# Patient Record
Sex: FEMALE | Race: WHITE | Employment: OTHER | URBAN - METROPOLITAN AREA
[De-identification: names, ages, dates, MRNs, and addresses within clinical notes are randomized per-mention and may not be internally consistent; named-entity substitution may affect disease eponyms.]

---

## 2017-08-15 ENCOUNTER — HOSPITAL ENCOUNTER (OUTPATIENT)
Dept: GENERAL RADIOLOGY | Facility: HOSPITAL | Age: 82
Discharge: HOME | End: 2017-08-15
Attending: INTERNAL MEDICINE
Payer: MEDICARE

## 2017-08-15 DIAGNOSIS — M54.6 THORACIC SPINE PAIN: ICD-10-CM

## 2017-08-15 PROCEDURE — 72072 X-RAY EXAM THORAC SPINE 3VWS: CPT | Performed by: INTERNAL MEDICINE

## 2019-01-01 ENCOUNTER — APPOINTMENT (OUTPATIENT)
Dept: MRI IMAGING | Facility: HOSPITAL | Age: 84
End: 2019-01-01
Attending: EMERGENCY MEDICINE
Payer: MEDICARE

## 2019-01-01 ENCOUNTER — APPOINTMENT (OUTPATIENT)
Dept: MRI IMAGING | Facility: HOSPITAL | Age: 84
End: 2019-01-01
Attending: Other
Payer: MEDICARE

## 2019-01-01 ENCOUNTER — APPOINTMENT (OUTPATIENT)
Dept: CV DIAGNOSTICS | Facility: HOSPITAL | Age: 84
DRG: 644 | End: 2019-01-01
Attending: HOSPITALIST
Payer: MEDICARE

## 2019-01-01 ENCOUNTER — APPOINTMENT (OUTPATIENT)
Dept: ULTRASOUND IMAGING | Facility: HOSPITAL | Age: 84
End: 2019-01-01
Attending: Other
Payer: MEDICARE

## 2019-01-01 ENCOUNTER — TELEPHONE (OUTPATIENT)
Dept: NEUROLOGY | Facility: CLINIC | Age: 84
End: 2019-01-01

## 2019-01-01 ENCOUNTER — HOSPITAL ENCOUNTER (OUTPATIENT)
Facility: HOSPITAL | Age: 84
Setting detail: OBSERVATION
Discharge: NURSING, REHAB OR LONG-TERM CARE | End: 2019-01-01
Attending: EMERGENCY MEDICINE | Admitting: HOSPITALIST
Payer: MEDICARE

## 2019-01-01 ENCOUNTER — APPOINTMENT (OUTPATIENT)
Dept: CT IMAGING | Facility: HOSPITAL | Age: 84
End: 2019-01-01
Attending: EMERGENCY MEDICINE
Payer: MEDICARE

## 2019-01-01 ENCOUNTER — APPOINTMENT (OUTPATIENT)
Dept: GENERAL RADIOLOGY | Facility: HOSPITAL | Age: 84
DRG: 644 | End: 2019-01-01
Attending: HOSPITALIST
Payer: MEDICARE

## 2019-01-01 ENCOUNTER — APPOINTMENT (OUTPATIENT)
Dept: CV DIAGNOSTICS | Facility: HOSPITAL | Age: 84
End: 2019-01-01
Attending: Other
Payer: MEDICARE

## 2019-01-01 ENCOUNTER — APPOINTMENT (OUTPATIENT)
Dept: PICC SERVICES | Facility: HOSPITAL | Age: 84
DRG: 644 | End: 2019-01-01
Attending: INTERNAL MEDICINE
Payer: MEDICARE

## 2019-01-01 ENCOUNTER — APPOINTMENT (OUTPATIENT)
Dept: CT IMAGING | Facility: HOSPITAL | Age: 84
DRG: 644 | End: 2019-01-01
Attending: HOSPITALIST
Payer: MEDICARE

## 2019-01-01 ENCOUNTER — HOSPITAL ENCOUNTER (INPATIENT)
Facility: HOSPITAL | Age: 84
LOS: 6 days | Discharge: ANOTHER HOSPITAL | DRG: 644 | End: 2019-01-01
Attending: EMERGENCY MEDICINE | Admitting: HOSPITALIST
Payer: MEDICARE

## 2019-01-01 VITALS
BODY MASS INDEX: 16.54 KG/M2 | OXYGEN SATURATION: 94 % | HEART RATE: 86 BPM | TEMPERATURE: 98 F | DIASTOLIC BLOOD PRESSURE: 48 MMHG | RESPIRATION RATE: 18 BRPM | SYSTOLIC BLOOD PRESSURE: 125 MMHG | WEIGHT: 102.88 LBS | HEIGHT: 66 IN

## 2019-01-01 VITALS
OXYGEN SATURATION: 100 % | HEART RATE: 66 BPM | WEIGHT: 111.5 LBS | RESPIRATION RATE: 21 BRPM | DIASTOLIC BLOOD PRESSURE: 92 MMHG | SYSTOLIC BLOOD PRESSURE: 177 MMHG | TEMPERATURE: 98 F | HEIGHT: 63 IN | BODY MASS INDEX: 19.76 KG/M2

## 2019-01-01 DIAGNOSIS — R42 DIZZINESS: Primary | ICD-10-CM

## 2019-01-01 DIAGNOSIS — E87.1 HYPONATREMIA: Primary | ICD-10-CM

## 2019-01-01 DIAGNOSIS — G45.9 TRANSIENT ISCHEMIC ATTACK (TIA): ICD-10-CM

## 2019-01-01 LAB
ALBUMIN SERPL BCP-MCNC: 2.6 G/DL (ref 3.5–4.8)
ALBUMIN SERPL-MCNC: 2.1 G/DL (ref 3.4–5)
ALBUMIN SERPL-MCNC: 2.2 G/DL (ref 3.4–5)
ALP SERPL-CCNC: 52 U/L (ref 32–100)
ALT SERPL-CCNC: 7 U/L (ref 14–54)
ANION GAP SERPL CALC-SCNC: 10 MMOL/L (ref 0–18)
ANION GAP SERPL CALC-SCNC: 2 MMOL/L (ref 0–18)
ANION GAP SERPL CALC-SCNC: 4 MMOL/L (ref 0–18)
ANION GAP SERPL CALC-SCNC: 4 MMOL/L (ref 0–18)
ANION GAP SERPL CALC-SCNC: 5 MMOL/L (ref 0–18)
ANION GAP SERPL CALC-SCNC: 6 MMOL/L (ref 0–18)
ANION GAP SERPL CALC-SCNC: 6 MMOL/L (ref 0–18)
ANION GAP SERPL CALC-SCNC: 7 MMOL/L (ref 0–18)
ANION GAP SERPL CALC-SCNC: 9 MMOL/L (ref 0–18)
AST SERPL-CCNC: 18 U/L (ref 15–41)
BACTERIA UR QL AUTO: NEGATIVE /HPF
BASOPHILS # BLD AUTO: 0.04 X10(3) UL (ref 0–0.2)
BASOPHILS # BLD AUTO: 0.04 X10(3) UL (ref 0–0.2)
BASOPHILS # BLD AUTO: 0.05 X10(3) UL (ref 0–0.2)
BASOPHILS # BLD AUTO: 0.06 X10(3) UL (ref 0–0.2)
BASOPHILS # BLD: 0 K/UL (ref 0–0.2)
BASOPHILS # BLD: 0.2 K/UL (ref 0–0.2)
BASOPHILS # BLD: 0.2 K/UL (ref 0–0.2)
BASOPHILS NFR BLD AUTO: 0.7 %
BASOPHILS NFR BLD AUTO: 0.8 %
BASOPHILS NFR BLD AUTO: 1 %
BASOPHILS NFR BLD AUTO: 1.1 %
BASOPHILS NFR BLD: 1 %
BASOPHILS NFR BLD: 4 %
BASOPHILS NFR BLD: 5 %
BILIRUB DIRECT SERPL-MCNC: 0.1 MG/DL (ref 0–0.2)
BILIRUB SERPL-MCNC: 0.5 MG/DL (ref 0.3–1.2)
BILIRUB UR QL: NEGATIVE
BILIRUB UR QL: NEGATIVE
BUN BLD-MCNC: 10 MG/DL (ref 7–18)
BUN BLD-MCNC: 12 MG/DL (ref 7–18)
BUN BLD-MCNC: 13 MG/DL (ref 7–18)
BUN BLD-MCNC: 14 MG/DL (ref 7–18)
BUN BLD-MCNC: 15 MG/DL (ref 7–18)
BUN BLD-MCNC: 15 MG/DL (ref 7–18)
BUN BLD-MCNC: 9 MG/DL (ref 7–18)
BUN SERPL-MCNC: 11 MG/DL (ref 8–20)
BUN SERPL-MCNC: 14 MG/DL (ref 8–20)
BUN SERPL-MCNC: 9 MG/DL (ref 8–20)
BUN/CREAT SERPL: 23.1 (ref 10–20)
BUN/CREAT SERPL: 24.4 (ref 10–20)
BUN/CREAT SERPL: 28.6 (ref 10–20)
BUN/CREAT SERPL: 29.2 (ref 10–20)
BUN/CREAT SERPL: 33.3 (ref 10–20)
BUN/CREAT SERPL: 36.1 (ref 10–20)
BUN/CREAT SERPL: 37.5 (ref 10–20)
BUN/CREAT SERPL: 41.2 (ref 10–20)
BUN/CREAT SERPL: 41.9 (ref 10–20)
BUN/CREAT SERPL: 44.4 (ref 10–20)
BUN/CREAT SERPL: 44.8 (ref 10–20)
BUN/CREAT SERPL: 48.3 (ref 10–20)
BUN/CREAT SERPL: 50 (ref 10–20)
BUN/CREAT SERPL: 56.5 (ref 10–20)
BUN/CREAT SERPL: 60 (ref 10–20)
CALCIUM BLD-MCNC: 7.2 MG/DL (ref 8.5–10.1)
CALCIUM BLD-MCNC: 7.4 MG/DL (ref 8.5–10.1)
CALCIUM BLD-MCNC: 7.5 MG/DL (ref 8.5–10.1)
CALCIUM BLD-MCNC: 7.6 MG/DL (ref 8.5–10.1)
CALCIUM BLD-MCNC: 7.6 MG/DL (ref 8.5–10.1)
CALCIUM BLD-MCNC: 7.7 MG/DL (ref 8.5–10.1)
CALCIUM BLD-MCNC: 7.8 MG/DL (ref 8.5–10.1)
CALCIUM BLD-MCNC: 7.9 MG/DL (ref 8.5–10.1)
CALCIUM BLD-MCNC: 7.9 MG/DL (ref 8.5–10.1)
CALCIUM BLD-MCNC: 8 MG/DL (ref 8.5–10.1)
CALCIUM BLD-MCNC: 8.2 MG/DL (ref 8.5–10.1)
CALCIUM BLD-MCNC: 8.2 MG/DL (ref 8.5–10.1)
CALCIUM SERPL-MCNC: 8.3 MG/DL (ref 8.5–10.5)
CALCIUM SERPL-MCNC: 8.8 MG/DL (ref 8.5–10.5)
CALCIUM SERPL-MCNC: 8.9 MG/DL (ref 8.5–10.5)
CHLORIDE SERPL-SCNC: 80 MMOL/L (ref 98–107)
CHLORIDE SERPL-SCNC: 82 MMOL/L (ref 98–107)
CHLORIDE SERPL-SCNC: 83 MMOL/L (ref 98–107)
CHLORIDE SERPL-SCNC: 87 MMOL/L (ref 98–107)
CHLORIDE SERPL-SCNC: 90 MMOL/L (ref 98–107)
CHLORIDE SERPL-SCNC: 90 MMOL/L (ref 98–107)
CHLORIDE SERPL-SCNC: 95 MMOL/L (ref 98–107)
CHLORIDE SERPL-SCNC: 96 MMOL/L (ref 95–110)
CHLORIDE SERPL-SCNC: 96 MMOL/L (ref 98–107)
CHLORIDE SERPL-SCNC: 96 MMOL/L (ref 98–107)
CHLORIDE SERPL-SCNC: 97 MMOL/L (ref 95–110)
CHLORIDE SERPL-SCNC: 97 MMOL/L (ref 98–107)
CHLORIDE SERPL-SCNC: 97 MMOL/L (ref 98–107)
CHLORIDE SERPL-SCNC: 98 MMOL/L (ref 95–110)
CHLORIDE SERPL-SCNC: 98 MMOL/L (ref 98–107)
CHOLEST SERPL-MCNC: 218 MG/DL (ref 110–200)
CK SERPL-CCNC: 81 U/L (ref 38–234)
CLARITY UR: CLEAR
CO2 SERPL-SCNC: 26 MMOL/L (ref 22–32)
CO2 SERPL-SCNC: 26 MMOL/L (ref 22–32)
CO2 SERPL-SCNC: 27 MMOL/L (ref 21–32)
CO2 SERPL-SCNC: 27 MMOL/L (ref 21–32)
CO2 SERPL-SCNC: 28 MMOL/L (ref 21–32)
CO2 SERPL-SCNC: 28 MMOL/L (ref 22–32)
CO2 SERPL-SCNC: 29 MMOL/L (ref 21–32)
CO2 SERPL-SCNC: 30 MMOL/L (ref 21–32)
CO2 SERPL-SCNC: 30 MMOL/L (ref 21–32)
CO2 SERPL-SCNC: 31 MMOL/L (ref 21–32)
CO2 SERPL-SCNC: 31 MMOL/L (ref 21–32)
CO2 SERPL-SCNC: 33 MMOL/L (ref 21–32)
CO2 SERPL-SCNC: 35 MMOL/L (ref 21–32)
COLOR UR: YELLOW
COLOR UR: YELLOW
CREAT BLD-MCNC: 0.23 MG/DL (ref 0.55–1.02)
CREAT BLD-MCNC: 0.24 MG/DL (ref 0.55–1.02)
CREAT BLD-MCNC: 0.25 MG/DL (ref 0.55–1.02)
CREAT BLD-MCNC: 0.27 MG/DL (ref 0.55–1.02)
CREAT BLD-MCNC: 0.28 MG/DL (ref 0.55–1.02)
CREAT BLD-MCNC: 0.29 MG/DL (ref 0.55–1.02)
CREAT BLD-MCNC: 0.29 MG/DL (ref 0.55–1.02)
CREAT BLD-MCNC: 0.31 MG/DL (ref 0.55–1.02)
CREAT BLD-MCNC: 0.34 MG/DL (ref 0.55–1.02)
CREAT BLD-MCNC: 0.35 MG/DL (ref 0.55–1.02)
CREAT BLD-MCNC: 0.36 MG/DL (ref 0.55–1.02)
CREAT BLD-MCNC: 0.45 MG/DL (ref 0.55–1.02)
CREAT SERPL-MCNC: 0.39 MG/DL (ref 0.5–1.5)
CREAT SERPL-MCNC: 0.45 MG/DL (ref 0.5–1.5)
CREAT SERPL-MCNC: 0.48 MG/DL (ref 0.5–1.5)
CREAT UR-SCNC: 51.1 MG/DL
DEPRECATED RDW RBC AUTO: 42.4 FL (ref 35.1–46.3)
DEPRECATED RDW RBC AUTO: 42.5 FL (ref 35.1–46.3)
DEPRECATED RDW RBC AUTO: 48.3 FL (ref 35.1–46.3)
DEPRECATED RDW RBC AUTO: 49.2 FL (ref 35.1–46.3)
EOSINOPHIL # BLD AUTO: 0 X10(3) UL (ref 0–0.7)
EOSINOPHIL # BLD AUTO: 0.01 X10(3) UL (ref 0–0.7)
EOSINOPHIL # BLD AUTO: 0.04 X10(3) UL (ref 0–0.7)
EOSINOPHIL # BLD AUTO: 0.1 X10(3) UL (ref 0–0.7)
EOSINOPHIL # BLD: 0.1 K/UL (ref 0–0.7)
EOSINOPHIL NFR BLD AUTO: 0 %
EOSINOPHIL NFR BLD AUTO: 0.2 %
EOSINOPHIL NFR BLD AUTO: 0.8 %
EOSINOPHIL NFR BLD AUTO: 1.8 %
EOSINOPHIL NFR BLD: 2 %
ERYTHROCYTE [DISTWIDTH] IN BLOOD BY AUTOMATED COUNT: 13.5 % (ref 11–15)
ERYTHROCYTE [DISTWIDTH] IN BLOOD BY AUTOMATED COUNT: 13.8 % (ref 11–15)
ERYTHROCYTE [DISTWIDTH] IN BLOOD BY AUTOMATED COUNT: 14.3 % (ref 11–15)
ERYTHROCYTE [DISTWIDTH] IN BLOOD BY AUTOMATED COUNT: 14.5 % (ref 11–15)
ERYTHROCYTE [DISTWIDTH] IN BLOOD BY AUTOMATED COUNT: 14.7 % (ref 11–15)
ERYTHROCYTE [DISTWIDTH] IN BLOOD BY AUTOMATED COUNT: 14.7 % (ref 11–15)
ERYTHROCYTE [DISTWIDTH] IN BLOOD BY AUTOMATED COUNT: 15 % (ref 11–15)
EST. AVERAGE GLUCOSE BLD GHB EST-MCNC: 97 MG/DL (ref 68–126)
GLUCOSE BLD-MCNC: 117 MG/DL (ref 70–99)
GLUCOSE BLD-MCNC: 81 MG/DL (ref 70–99)
GLUCOSE BLD-MCNC: 86 MG/DL (ref 70–99)
GLUCOSE BLD-MCNC: 89 MG/DL (ref 70–99)
GLUCOSE BLD-MCNC: 91 MG/DL (ref 70–99)
GLUCOSE BLD-MCNC: 92 MG/DL (ref 70–99)
GLUCOSE BLD-MCNC: 92 MG/DL (ref 70–99)
GLUCOSE BLD-MCNC: 93 MG/DL (ref 70–99)
GLUCOSE BLD-MCNC: 93 MG/DL (ref 70–99)
GLUCOSE BLD-MCNC: 94 MG/DL (ref 70–99)
GLUCOSE BLD-MCNC: 94 MG/DL (ref 70–99)
GLUCOSE BLD-MCNC: 97 MG/DL (ref 70–99)
GLUCOSE SERPL-MCNC: 86 MG/DL (ref 70–99)
GLUCOSE SERPL-MCNC: 91 MG/DL (ref 70–99)
GLUCOSE SERPL-MCNC: 96 MG/DL (ref 70–99)
GLUCOSE UR-MCNC: NEGATIVE MG/DL
GLUCOSE UR-MCNC: NEGATIVE MG/DL
HBA1C MFR BLD HPLC: 5 % (ref ?–5.7)
HCT VFR BLD AUTO: 29.8 % (ref 35–48)
HCT VFR BLD AUTO: 30 % (ref 35–48)
HCT VFR BLD AUTO: 30.2 % (ref 35–48)
HCT VFR BLD AUTO: 31.1 % (ref 35–48)
HCT VFR BLD AUTO: 31.5 % (ref 35–48)
HCT VFR BLD AUTO: 31.8 % (ref 35–48)
HCT VFR BLD AUTO: 33.1 % (ref 35–48)
HCT VFR BLD AUTO: 33.5 % (ref 35–48)
HCT VFR BLD AUTO: 37.2 % (ref 35–48)
HDLC SERPL-MCNC: 73 MG/DL
HGB BLD-MCNC: 10.4 G/DL (ref 12–16)
HGB BLD-MCNC: 10.7 G/DL (ref 12–16)
HGB BLD-MCNC: 11 G/DL (ref 12–16)
HGB BLD-MCNC: 11.1 G/DL (ref 12–16)
HGB BLD-MCNC: 12.2 G/DL (ref 12–16)
HGB BLD-MCNC: 9.2 G/DL (ref 12–16)
HGB BLD-MCNC: 9.2 G/DL (ref 12–16)
HGB BLD-MCNC: 9.3 G/DL (ref 12–16)
HGB BLD-MCNC: 9.6 G/DL (ref 12–16)
IMM GRANULOCYTES # BLD AUTO: 0.01 X10(3) UL (ref 0–1)
IMM GRANULOCYTES # BLD AUTO: 0.01 X10(3) UL (ref 0–1)
IMM GRANULOCYTES # BLD AUTO: 0.02 X10(3) UL (ref 0–1)
IMM GRANULOCYTES # BLD AUTO: 0.03 X10(3) UL (ref 0–1)
IMM GRANULOCYTES NFR BLD: 0.2 %
IMM GRANULOCYTES NFR BLD: 0.2 %
IMM GRANULOCYTES NFR BLD: 0.4 %
IMM GRANULOCYTES NFR BLD: 0.6 %
INR BLD: 1.1 (ref 0.9–1.2)
KETONES UR-MCNC: 20 MG/DL
LDLC SERPL CALC-MCNC: 136 MG/DL (ref 0–99)
LEUKOCYTE ESTERASE UR QL STRIP.AUTO: NEGATIVE
LEUKOCYTE ESTERASE UR QL STRIP.AUTO: NEGATIVE
LYMPHOCYTES # BLD AUTO: 0.45 X10(3) UL (ref 1–4)
LYMPHOCYTES # BLD AUTO: 0.46 X10(3) UL (ref 1–4)
LYMPHOCYTES # BLD AUTO: 0.52 X10(3) UL (ref 1–4)
LYMPHOCYTES # BLD AUTO: 0.53 X10(3) UL (ref 1–4)
LYMPHOCYTES # BLD: 0.5 K/UL (ref 1–4)
LYMPHOCYTES # BLD: 0.6 K/UL (ref 1–4)
LYMPHOCYTES # BLD: 0.6 K/UL (ref 1–4)
LYMPHOCYTES NFR BLD AUTO: 10.1 %
LYMPHOCYTES NFR BLD AUTO: 8.6 %
LYMPHOCYTES NFR BLD AUTO: 9.2 %
LYMPHOCYTES NFR BLD AUTO: 9.2 %
LYMPHOCYTES NFR BLD: 13 %
LYMPHOCYTES NFR BLD: 13 %
LYMPHOCYTES NFR BLD: 16 %
MCH RBC QN AUTO: 28.1 PG (ref 26–34)
MCH RBC QN AUTO: 28.3 PG (ref 26–34)
MCH RBC QN AUTO: 28.4 PG (ref 26–34)
MCH RBC QN AUTO: 28.5 PG (ref 26–34)
MCH RBC QN AUTO: 28.7 PG (ref 27–32)
MCH RBC QN AUTO: 28.9 PG (ref 27–32)
MCH RBC QN AUTO: 29.1 PG (ref 27–32)
MCHC RBC AUTO-ENTMCNC: 30.7 G/DL (ref 31–37)
MCHC RBC AUTO-ENTMCNC: 30.9 G/DL (ref 31–37)
MCHC RBC AUTO-ENTMCNC: 32.8 G/DL (ref 32–37)
MCHC RBC AUTO-ENTMCNC: 32.9 G/DL (ref 32–37)
MCHC RBC AUTO-ENTMCNC: 33 G/DL (ref 31–37)
MCHC RBC AUTO-ENTMCNC: 33.5 G/DL (ref 31–37)
MCHC RBC AUTO-ENTMCNC: 33.7 G/DL (ref 32–37)
MCV RBC AUTO: 84.7 FL (ref 80–100)
MCV RBC AUTO: 85.1 FL (ref 80–100)
MCV RBC AUTO: 86.4 FL (ref 80–100)
MCV RBC AUTO: 87.3 FL (ref 80–100)
MCV RBC AUTO: 87.8 FL (ref 80–100)
MCV RBC AUTO: 91.7 FL (ref 80–100)
MCV RBC AUTO: 92.9 FL (ref 80–100)
MONOCYTES # BLD AUTO: 0.57 X10(3) UL (ref 0.1–1)
MONOCYTES # BLD AUTO: 0.58 X10(3) UL (ref 0.1–1)
MONOCYTES # BLD AUTO: 0.64 X10(3) UL (ref 0.1–1)
MONOCYTES # BLD AUTO: 0.77 X10(3) UL (ref 0.1–1)
MONOCYTES # BLD: 0.4 K/UL (ref 0–1)
MONOCYTES # BLD: 0.4 K/UL (ref 0–1)
MONOCYTES # BLD: 0.6 K/UL (ref 0–1)
MONOCYTES NFR BLD AUTO: 10.7 %
MONOCYTES NFR BLD AUTO: 11.3 %
MONOCYTES NFR BLD AUTO: 11.8 %
MONOCYTES NFR BLD AUTO: 14.7 %
MONOCYTES NFR BLD: 10 %
MONOCYTES NFR BLD: 10 %
MONOCYTES NFR BLD: 12 %
MRSA DNA SPEC QL NAA+PROBE: NEGATIVE
NEUTROPHILS # BLD AUTO: 2.7 K/UL (ref 1.8–7.7)
NEUTROPHILS # BLD AUTO: 2.9 K/UL (ref 1.8–7.7)
NEUTROPHILS # BLD AUTO: 3.4 K/UL (ref 1.8–7.7)
NEUTROPHILS # BLD AUTO: 3.78 X10 (3) UL (ref 1.5–7.7)
NEUTROPHILS # BLD AUTO: 3.78 X10(3) UL (ref 1.5–7.7)
NEUTROPHILS # BLD AUTO: 3.86 X10 (3) UL (ref 1.5–7.7)
NEUTROPHILS # BLD AUTO: 3.86 X10(3) UL (ref 1.5–7.7)
NEUTROPHILS # BLD AUTO: 4.25 X10 (3) UL (ref 1.5–7.7)
NEUTROPHILS # BLD AUTO: 4.25 X10(3) UL (ref 1.5–7.7)
NEUTROPHILS # BLD AUTO: 4.31 X10 (3) UL (ref 1.5–7.7)
NEUTROPHILS # BLD AUTO: 4.31 X10(3) UL (ref 1.5–7.7)
NEUTROPHILS NFR BLD AUTO: 73.6 %
NEUTROPHILS NFR BLD AUTO: 76.4 %
NEUTROPHILS NFR BLD AUTO: 77 %
NEUTROPHILS NFR BLD AUTO: 79.6 %
NEUTROPHILS NFR BLD: 69 %
NEUTROPHILS NFR BLD: 70 %
NEUTROPHILS NFR BLD: 72 %
NITRITE UR QL STRIP.AUTO: NEGATIVE
NITRITE UR QL STRIP.AUTO: NEGATIVE
NONHDLC SERPL-MCNC: 145 MG/DL
OSMOLALITY SERPL CALC.SUM OF ELEC: 242 MOSM/KG (ref 275–295)
OSMOLALITY SERPL CALC.SUM OF ELEC: 242 MOSM/KG (ref 275–295)
OSMOLALITY SERPL CALC.SUM OF ELEC: 248 MOSM/KG (ref 275–295)
OSMOLALITY SERPL CALC.SUM OF ELEC: 260 MOSM/KG (ref 275–295)
OSMOLALITY SERPL CALC.SUM OF ELEC: 260 MOSM/KG (ref 275–295)
OSMOLALITY SERPL CALC.SUM OF ELEC: 261 MOSM/KG (ref 275–295)
OSMOLALITY SERPL CALC.SUM OF ELEC: 271 MOSM/KG (ref 275–295)
OSMOLALITY SERPL CALC.SUM OF ELEC: 272 MOSM/KG (ref 275–295)
OSMOLALITY SERPL CALC.SUM OF ELEC: 272 MOSM/KG (ref 275–295)
OSMOLALITY SERPL CALC.SUM OF ELEC: 274 MOSM/KG (ref 275–295)
OSMOLALITY SERPL CALC.SUM OF ELEC: 275 MOSM/KG (ref 275–295)
OSMOLALITY SERPL CALC.SUM OF ELEC: 276 MOSM/KG (ref 275–295)
OSMOLALITY SERPL: 233 MOSM/KG (ref 275–295)
OSMOLALITY UR CALC.SUM OF ELEC: 270 MOSM/KG (ref 275–295)
OSMOLALITY UR CALC.SUM OF ELEC: 275 MOSM/KG (ref 275–295)
OSMOLALITY UR CALC.SUM OF ELEC: 280 MOSM/KG (ref 275–295)
OSMOLALITY UR: 507 MOSM/KG (ref 300–1100)
PH UR: 7 [PH] (ref 5–8)
PH UR: 8 [PH] (ref 5–8)
PHOSPHATE SERPL-MCNC: 2.1 MG/DL (ref 2.5–4.9)
PHOSPHATE SERPL-MCNC: 2.7 MG/DL (ref 2.5–4.9)
PLATELET # BLD AUTO: 154 K/UL (ref 140–400)
PLATELET # BLD AUTO: 159 K/UL (ref 140–400)
PLATELET # BLD AUTO: 171 K/UL (ref 140–400)
PLATELET # BLD AUTO: 183 10(3)UL (ref 150–450)
PLATELET # BLD AUTO: 207 10(3)UL (ref 150–450)
PLATELET # BLD AUTO: 253 10(3)UL (ref 150–450)
PLATELET # BLD AUTO: 274 10(3)UL (ref 150–450)
PMV BLD AUTO: 7.9 FL (ref 7.4–10.3)
PMV BLD AUTO: 8 FL (ref 7.4–10.3)
PMV BLD AUTO: 8.4 FL (ref 7.4–10.3)
POTASSIUM SERPL-SCNC: 3.4 MMOL/L (ref 3.3–5.1)
POTASSIUM SERPL-SCNC: 3.4 MMOL/L (ref 3.5–5.1)
POTASSIUM SERPL-SCNC: 3.4 MMOL/L (ref 3.5–5.1)
POTASSIUM SERPL-SCNC: 3.7 MMOL/L (ref 3.5–5.1)
POTASSIUM SERPL-SCNC: 3.7 MMOL/L (ref 3.5–5.1)
POTASSIUM SERPL-SCNC: 3.8 MMOL/L (ref 3.5–5.1)
POTASSIUM SERPL-SCNC: 3.9 MMOL/L (ref 3.3–5.1)
POTASSIUM SERPL-SCNC: 3.9 MMOL/L (ref 3.5–5.1)
POTASSIUM SERPL-SCNC: 4 MMOL/L (ref 3.5–5.1)
POTASSIUM SERPL-SCNC: 4.2 MMOL/L (ref 3.5–5.1)
POTASSIUM SERPL-SCNC: 4.2 MMOL/L (ref 3.5–5.1)
POTASSIUM SERPL-SCNC: 4.3 MMOL/L (ref 3.5–5.1)
POTASSIUM SERPL-SCNC: 4.3 MMOL/L (ref 3.5–5.1)
POTASSIUM SERPL-SCNC: 4.6 MMOL/L (ref 3.3–5.1)
POTASSIUM SERPL-SCNC: 4.7 MMOL/L (ref 3.5–5.1)
POTASSIUM SERPL-SCNC: 4.9 MMOL/L (ref 3.3–5.1)
PROT SERPL-MCNC: 5.2 G/DL (ref 5.9–8.4)
PROT UR-MCNC: 100 MG/DL
PROT UR-MCNC: >=500 MG/DL
PROTHROMBIN TIME: 13.9 SECONDS (ref 11.8–14.5)
RBC # BLD AUTO: 3.23 X10(6)UL (ref 3.8–5.3)
RBC # BLD AUTO: 3.25 X10(6)UL (ref 3.8–5.3)
RBC # BLD AUTO: 3.68 M/UL (ref 3.7–5.4)
RBC # BLD AUTO: 3.7 X10(6)UL (ref 3.8–5.3)
RBC # BLD AUTO: 3.84 M/UL (ref 3.7–5.4)
RBC # BLD AUTO: 3.91 X10(6)UL (ref 3.8–5.3)
RBC # BLD AUTO: 4.24 M/UL (ref 3.7–5.4)
RBC #/AREA URNS AUTO: 28 /HPF
RBC #/AREA URNS AUTO: 28 /HPF
SODIUM SERPL-SCNC: 116 MMOL/L (ref 136–145)
SODIUM SERPL-SCNC: 116 MMOL/L (ref 136–145)
SODIUM SERPL-SCNC: 117 MMOL/L (ref 136–145)
SODIUM SERPL-SCNC: 117 MMOL/L (ref 136–145)
SODIUM SERPL-SCNC: 118 MMOL/L (ref 136–145)
SODIUM SERPL-SCNC: 118 MMOL/L (ref 136–145)
SODIUM SERPL-SCNC: 119 MMOL/L (ref 136–145)
SODIUM SERPL-SCNC: 122 MMOL/L (ref 136–145)
SODIUM SERPL-SCNC: 124 MMOL/L (ref 136–145)
SODIUM SERPL-SCNC: 124 MMOL/L (ref 136–145)
SODIUM SERPL-SCNC: 125 MMOL/L (ref 136–145)
SODIUM SERPL-SCNC: 125 MMOL/L (ref 136–145)
SODIUM SERPL-SCNC: 128 MMOL/L (ref 136–145)
SODIUM SERPL-SCNC: 131 MMOL/L (ref 136–144)
SODIUM SERPL-SCNC: 131 MMOL/L (ref 136–145)
SODIUM SERPL-SCNC: 132 MMOL/L (ref 136–145)
SODIUM SERPL-SCNC: 132 MMOL/L (ref 136–145)
SODIUM SERPL-SCNC: 133 MMOL/L (ref 136–144)
SODIUM SERPL-SCNC: 133 MMOL/L (ref 136–145)
SODIUM SERPL-SCNC: 133 MMOL/L (ref 136–145)
SODIUM SERPL-SCNC: 135 MMOL/L (ref 136–144)
SODIUM SERPL-SCNC: 98 MMOL/L
SP GR UR STRIP: 1.02 (ref 1–1.03)
SP GR UR STRIP: 1.02 (ref 1–1.03)
TRIGL SERPL-MCNC: 47 MG/DL (ref 1–149)
TSH SERPL-ACNC: 2.99 UIU/ML (ref 0.45–5.33)
UROBILINOGEN UR STRIP-ACNC: <2
UROBILINOGEN UR STRIP-ACNC: <2
VIT C UR-MCNC: NEGATIVE MG/DL
VIT C UR-MCNC: NEGATIVE MG/DL
WBC # BLD AUTO: 3.9 K/UL (ref 4–11)
WBC # BLD AUTO: 4 K/UL (ref 4–11)
WBC # BLD AUTO: 4.9 K/UL (ref 4–11)
WBC # BLD AUTO: 4.9 X10(3) UL (ref 4–11)
WBC # BLD AUTO: 5.2 X10(3) UL (ref 4–11)
WBC # BLD AUTO: 5.3 X10(3) UL (ref 4–11)
WBC # BLD AUTO: 5.6 X10(3) UL (ref 4–11)
WBC #/AREA URNS AUTO: 3 /HPF
WBC #/AREA URNS AUTO: 3 /HPF

## 2019-01-01 PROCEDURE — 99292 CRITICAL CARE ADDL 30 MIN: CPT | Performed by: HOSPITALIST

## 2019-01-01 PROCEDURE — 70450 CT HEAD/BRAIN W/O DYE: CPT | Performed by: EMERGENCY MEDICINE

## 2019-01-01 PROCEDURE — 99233 SBSQ HOSP IP/OBS HIGH 50: CPT | Performed by: HOSPITALIST

## 2019-01-01 PROCEDURE — 93306 TTE W/DOPPLER COMPLETE: CPT | Performed by: HOSPITALIST

## 2019-01-01 PROCEDURE — 71275 CT ANGIOGRAPHY CHEST: CPT | Performed by: HOSPITALIST

## 2019-01-01 PROCEDURE — 99223 1ST HOSP IP/OBS HIGH 75: CPT | Performed by: HOSPITALIST

## 2019-01-01 PROCEDURE — 99239 HOSP IP/OBS DSCHRG MGMT >30: CPT | Performed by: HOSPITALIST

## 2019-01-01 PROCEDURE — B5181ZA FLUOROSCOPY OF SUPERIOR VENA CAVA USING LOW OSMOLAR CONTRAST, GUIDANCE: ICD-10-PCS | Performed by: HOSPITALIST

## 2019-01-01 PROCEDURE — 70551 MRI BRAIN STEM W/O DYE: CPT | Performed by: EMERGENCY MEDICINE

## 2019-01-01 PROCEDURE — 99291 CRITICAL CARE FIRST HOUR: CPT | Performed by: HOSPITALIST

## 2019-01-01 PROCEDURE — 02HV33Z INSERTION OF INFUSION DEVICE INTO SUPERIOR VENA CAVA, PERCUTANEOUS APPROACH: ICD-10-PCS | Performed by: HOSPITALIST

## 2019-01-01 PROCEDURE — 71045 X-RAY EXAM CHEST 1 VIEW: CPT | Performed by: HOSPITALIST

## 2019-01-01 PROCEDURE — 99226 SUBSEQUENT OBSERVATION CARE: CPT | Performed by: HOSPITALIST

## 2019-01-01 PROCEDURE — 72125 CT NECK SPINE W/O DYE: CPT | Performed by: EMERGENCY MEDICINE

## 2019-01-01 PROCEDURE — 93880 EXTRACRANIAL BILAT STUDY: CPT | Performed by: OTHER

## 2019-01-01 PROCEDURE — 99217 OBSERVATION CARE DISCHARGE: CPT | Performed by: HOSPITALIST

## 2019-01-01 PROCEDURE — 72141 MRI NECK SPINE W/O DYE: CPT | Performed by: OTHER

## 2019-01-01 PROCEDURE — 99203 OFFICE O/P NEW LOW 30 MIN: CPT | Performed by: OTHER

## 2019-01-01 PROCEDURE — 93306 TTE W/DOPPLER COMPLETE: CPT | Performed by: OTHER

## 2019-01-01 PROCEDURE — 99214 OFFICE O/P EST MOD 30 MIN: CPT | Performed by: OTHER

## 2019-01-01 PROCEDURE — 70544 MR ANGIOGRAPHY HEAD W/O DYE: CPT | Performed by: EMERGENCY MEDICINE

## 2019-01-01 PROCEDURE — 99220 INITIAL OBSERVATION CARE,LEVL III: CPT | Performed by: HOSPITALIST

## 2019-01-01 PROCEDURE — 70549 MR ANGIOGRAPH NECK W/O&W/DYE: CPT | Performed by: EMERGENCY MEDICINE

## 2019-01-01 RX ORDER — 3% SODIUM CHLORIDE 3 G/100ML
INJECTION, SOLUTION INTRAVENOUS CONTINUOUS
Status: DISCONTINUED | OUTPATIENT
Start: 2019-01-01 | End: 2019-01-01

## 2019-01-01 RX ORDER — DOCUSATE SODIUM 100 MG/1
100 CAPSULE, LIQUID FILLED ORAL 2 TIMES DAILY
Status: DISCONTINUED | OUTPATIENT
Start: 2019-01-01 | End: 2019-01-01

## 2019-01-01 RX ORDER — BISACODYL 10 MG
10 SUPPOSITORY, RECTAL RECTAL
Status: DISCONTINUED | OUTPATIENT
Start: 2019-01-01 | End: 2019-01-01

## 2019-01-01 RX ORDER — ACETAMINOPHEN 325 MG/1
650 TABLET ORAL EVERY 6 HOURS PRN
Status: DISCONTINUED | OUTPATIENT
Start: 2019-01-01 | End: 2019-01-01

## 2019-01-01 RX ORDER — DOCUSATE SODIUM 100 MG/1
50 CAPSULE, LIQUID FILLED ORAL 2 TIMES DAILY
COMMUNITY

## 2019-01-01 RX ORDER — SODIUM CHLORIDE 0.9 % (FLUSH) 0.9 %
3 SYRINGE (ML) INJECTION AS NEEDED
Status: DISCONTINUED | OUTPATIENT
Start: 2019-01-01 | End: 2019-01-01

## 2019-01-01 RX ORDER — SODIUM CHLORIDE 9 MG/ML
INJECTION, SOLUTION INTRAVENOUS CONTINUOUS
Status: DISCONTINUED | OUTPATIENT
Start: 2019-01-01 | End: 2019-01-01

## 2019-01-01 RX ORDER — SODIUM CHLORIDE 9 MG/ML
1000 INJECTION, SOLUTION INTRAVENOUS ONCE
Status: COMPLETED | OUTPATIENT
Start: 2019-01-01 | End: 2019-01-01

## 2019-01-01 RX ORDER — AMIODARONE HYDROCHLORIDE 200 MG/1
200 TABLET ORAL DAILY
Status: DISCONTINUED | OUTPATIENT
Start: 2019-01-01 | End: 2019-01-01

## 2019-01-01 RX ORDER — POLYETHYLENE GLYCOL 3350 17 G/17G
17 POWDER, FOR SOLUTION ORAL DAILY PRN
Status: DISCONTINUED | OUTPATIENT
Start: 2019-01-01 | End: 2019-01-01

## 2019-01-01 RX ORDER — LABETALOL HYDROCHLORIDE 5 MG/ML
10 INJECTION, SOLUTION INTRAVENOUS ONCE
Status: DISCONTINUED | OUTPATIENT
Start: 2019-01-01 | End: 2019-01-01

## 2019-01-01 RX ORDER — LOSARTAN POTASSIUM 50 MG/1
50 TABLET ORAL 2 TIMES DAILY
COMMUNITY

## 2019-01-01 RX ORDER — SODIUM PHOSPHATE, DIBASIC AND SODIUM PHOSPHATE, MONOBASIC 7; 19 G/133ML; G/133ML
1 ENEMA RECTAL ONCE AS NEEDED
Status: DISCONTINUED | OUTPATIENT
Start: 2019-01-01 | End: 2019-01-01

## 2019-01-01 RX ORDER — SODIUM CHLORIDE 0.9 % (FLUSH) 0.9 %
10 SYRINGE (ML) INJECTION AS NEEDED
Status: DISCONTINUED | OUTPATIENT
Start: 2019-01-01 | End: 2019-01-01

## 2019-01-01 RX ORDER — ACETAMINOPHEN 325 MG/1
650 TABLET ORAL EVERY 6 HOURS PRN
COMMUNITY

## 2019-01-01 RX ORDER — ATORVASTATIN CALCIUM 40 MG/1
40 TABLET, FILM COATED ORAL NIGHTLY
Status: DISCONTINUED | OUTPATIENT
Start: 2019-01-01 | End: 2019-01-01

## 2019-01-01 RX ORDER — ASPIRIN 325 MG
325 TABLET ORAL DAILY
Status: DISCONTINUED | OUTPATIENT
Start: 2019-01-01 | End: 2019-01-01

## 2019-01-01 RX ORDER — ATORVASTATIN CALCIUM 40 MG/1
40 TABLET, FILM COATED ORAL NIGHTLY
COMMUNITY

## 2019-01-01 RX ORDER — 0.9 % SODIUM CHLORIDE 0.9 %
VIAL (ML) INJECTION
Status: COMPLETED
Start: 2019-01-01 | End: 2019-01-01

## 2019-01-01 RX ORDER — POLYETHYLENE GLYCOL 3350 17 G/17G
17 POWDER, FOR SOLUTION ORAL DAILY
COMMUNITY

## 2019-01-01 RX ORDER — LABETALOL HYDROCHLORIDE 5 MG/ML
10 INJECTION, SOLUTION INTRAVENOUS
Status: DISCONTINUED | OUTPATIENT
Start: 2019-01-01 | End: 2019-01-01

## 2019-01-01 RX ORDER — FUROSEMIDE 20 MG/1
20 TABLET ORAL ONCE
Status: COMPLETED | OUTPATIENT
Start: 2019-01-01 | End: 2019-01-01

## 2019-01-01 RX ORDER — CLOPIDOGREL BISULFATE 75 MG/1
75 TABLET ORAL DAILY
Status: DISCONTINUED | OUTPATIENT
Start: 2019-01-01 | End: 2019-01-01

## 2019-01-01 RX ORDER — LOSARTAN POTASSIUM 100 MG/1
100 TABLET ORAL 2 TIMES DAILY
Status: DISCONTINUED | OUTPATIENT
Start: 2019-01-01 | End: 2019-01-01

## 2019-01-01 RX ORDER — ONDANSETRON 2 MG/ML
4 INJECTION INTRAMUSCULAR; INTRAVENOUS EVERY 6 HOURS PRN
Status: DISCONTINUED | OUTPATIENT
Start: 2019-01-01 | End: 2019-01-01

## 2019-01-01 RX ORDER — HEPARIN SODIUM 5000 [USP'U]/ML
5000 INJECTION, SOLUTION INTRAVENOUS; SUBCUTANEOUS EVERY 12 HOURS SCHEDULED
Status: DISCONTINUED | OUTPATIENT
Start: 2019-01-01 | End: 2019-01-01

## 2019-01-01 RX ORDER — KETOROLAC TROMETHAMINE 15 MG/ML
15 INJECTION, SOLUTION INTRAMUSCULAR; INTRAVENOUS EVERY 6 HOURS PRN
Status: DISCONTINUED | OUTPATIENT
Start: 2019-01-01 | End: 2019-01-01

## 2019-01-01 RX ORDER — FUROSEMIDE 20 MG/1
20 TABLET ORAL DAILY
Status: DISCONTINUED | OUTPATIENT
Start: 2019-01-01 | End: 2019-01-01

## 2019-01-01 RX ORDER — AMLODIPINE BESYLATE 10 MG/1
10 TABLET ORAL DAILY
Status: ON HOLD | COMMUNITY
End: 2019-01-01 | Stop reason: ALTCHOICE

## 2019-01-01 RX ORDER — SENNOSIDES 8.6 MG
17.2 TABLET ORAL NIGHTLY
Status: DISCONTINUED | OUTPATIENT
Start: 2019-01-01 | End: 2019-01-01

## 2019-01-01 RX ORDER — LOSARTAN POTASSIUM 50 MG/1
50 TABLET ORAL 2 TIMES DAILY
Status: DISCONTINUED | OUTPATIENT
Start: 2019-01-01 | End: 2019-01-01

## 2019-01-01 RX ORDER — ATORVASTATIN CALCIUM 40 MG/1
80 TABLET, FILM COATED ORAL NIGHTLY
Status: DISCONTINUED | OUTPATIENT
Start: 2019-01-01 | End: 2019-01-01

## 2019-01-01 RX ORDER — LABETALOL HYDROCHLORIDE 5 MG/ML
10 INJECTION, SOLUTION INTRAVENOUS ONCE
Status: COMPLETED | OUTPATIENT
Start: 2019-01-01 | End: 2019-01-01

## 2019-01-01 RX ORDER — POTASSIUM CHLORIDE 20 MEQ/1
40 TABLET, EXTENDED RELEASE ORAL EVERY 4 HOURS
Status: COMPLETED | OUTPATIENT
Start: 2019-01-01 | End: 2019-01-01

## 2019-01-01 RX ORDER — POLYETHYLENE GLYCOL 3350 17 G/17G
17 POWDER, FOR SOLUTION ORAL DAILY
Status: DISCONTINUED | OUTPATIENT
Start: 2019-01-01 | End: 2019-01-01

## 2019-01-01 RX ORDER — HYDRALAZINE HYDROCHLORIDE 20 MG/ML
10 INJECTION INTRAMUSCULAR; INTRAVENOUS EVERY 4 HOURS PRN
Status: DISCONTINUED | OUTPATIENT
Start: 2019-01-01 | End: 2019-01-01

## 2019-01-01 RX ORDER — LIDOCAINE HYDROCHLORIDE 10 MG/ML
0.5 INJECTION, SOLUTION INFILTRATION; PERINEURAL ONCE AS NEEDED
Status: ACTIVE | OUTPATIENT
Start: 2019-01-01 | End: 2019-01-01

## 2019-01-01 RX ORDER — AMIODARONE HYDROCHLORIDE 200 MG/1
200 TABLET ORAL DAILY
COMMUNITY

## 2019-01-01 RX ORDER — FUROSEMIDE 10 MG/ML
20 INJECTION INTRAMUSCULAR; INTRAVENOUS ONCE
Status: COMPLETED | OUTPATIENT
Start: 2019-01-01 | End: 2019-01-01

## 2019-01-01 RX ORDER — AMLODIPINE BESYLATE 10 MG/1
10 TABLET ORAL DAILY
Status: DISCONTINUED | OUTPATIENT
Start: 2019-01-01 | End: 2019-01-01

## 2019-01-01 RX ORDER — DEXTROSE AND SODIUM CHLORIDE 5; .45 G/100ML; G/100ML
INJECTION, SOLUTION INTRAVENOUS CONTINUOUS
Status: DISCONTINUED | OUTPATIENT
Start: 2019-01-01 | End: 2019-01-01

## 2019-01-12 PROBLEM — G45.9 TRANSIENT ISCHEMIC ATTACK (TIA): Status: ACTIVE | Noted: 2019-01-01

## 2019-01-12 PROBLEM — R42 DIZZINESS: Status: ACTIVE | Noted: 2019-01-01

## 2019-01-12 NOTE — ED INITIAL ASSESSMENT (HPI)
Patient complains of dizziness and right eye vision loss since this am.  Patient almost fell due to dizziness but caught herself with a kitchen chair. Patient then crawled to living room and called 911. No LOC.   Patient reports a headache last night jesus

## 2019-01-12 NOTE — CONSULTS
[facility] - [facility]    Report of Consultation    [facility] Patient Status:  Emergency    1934 MRN [id number]   Location [address] Attending Sophie Madrigal MD   Hosp Day # 0 PCP Elva Morris MD     Date o on file    Social History Narrative    None on file            Current Medications:    No current facility-administered medications for this encounter.      (Not in a hospital admission)    Allergies  No Known Allergies    Review of Systems:   As in HPI, th Reflexes:  Biceps 2+ bilateral symmetric  Triceps 2+ bilateral symmetric  Brachioradialis 2 + bilateral symmetric  Patellar 2+ bilateral symmetric  Ankle jerk 2+ bilateral symmetric    No clonus  No Babinski sign    Coordination:  Finger to nose intact  Ra within the internal carotid arteries and middle cerebral arteries without focal vascular cutoff, high-grade stenosis or aneurysmal dilation given limitations from motion artifact.   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

## 2019-01-12 NOTE — ED PROVIDER NOTES
Patient Seen in: [facility] AND [facility] Emergency Department    History   Patient presents with:  Dizziness (neurologic)    Stated Complaint: dizziness    HPI    Patient is an 80-year-old female who presents with dizziness that started this morning.   She stat eye  Neck: supple, non tender, no c-spine tenderness  CV: RRR, no murmurs  Resp: CTAB, no wheezes or retractions  Ab: soft, nontender, no distension  Extremities: FROM of all extremities, no cyanosis/clubbing/edema  Neuro: CN intact, normal speech, no pron Date: 1/12/2019  CONCLUSION:  1. Age-related atrophy with moderate chronic white matter small vessel ischemic changes. 2. No visualized acute or cerebral chronic infarct, intracranial mass, hemorrhage or hematoma.  3. A few tiny areas of old lacunar infarct pm    Follow-up:  No follow-up provider specified. We recommend that you schedule follow up care with a primary care provider within the next three months to obtain basic health screening including reassessment of your blood pressure.     Medications Presc

## 2019-01-13 NOTE — PLAN OF CARE
Problem: CARDIOVASCULAR - ADULT  Goal: Maintains optimal cardiac output and hemodynamic stability  INTERVENTIONS:  - Monitor vital signs, rhythm, and trends  - Monitor for bleeding, hypotension and signs of decreased cardiac output  - Evaluate effectivenes ordered  Outcome: Progressing    Goal: Achieves maximal functionality and self care  INTERVENTIONS  - Monitor swallowing and airway patency with patient fatigue and changes in neurological status  - Encourage and assist patient to increase activity and epi

## 2019-01-13 NOTE — SLP NOTE
ADULT SWALLOWING EVALUATION    ASSESSMENT    ASSESSMENT/OVERALL IMPRESSION:  Orders received, chart reviewed. Pt seen sitting upright in chair for BSSE. Pt alert, oriented and cooperative for PO trials.  Pt self fed trials of of thin liquids, puree and regu for Referral: R/O aspiration    Problem List  Principal Problem:    Dizziness  Active Problems:    Transient ischemic attack (TIA)    Past Medical History  Past Medical History:   Diagnosis Date   • Aortic aneurysm, abdominal ([address]     Aortic stents   • Ar over 2 session(s).   In Progress   Goal #2 The patient will utilize compensatory strategies as outlined by  BSSE (clinical evaluation) including Small bites, Small sips, Alternate liquids/solids, No straws, Upright 90 degrees with mild assistance 100 % of t

## 2019-01-13 NOTE — SLP NOTE
SLP attempted to see patient for a BSE this AM. Per RN, patient off floor and not available. RN to contact SLP when patient available to participate in BSE.     Thank you,    Susan Cat M.S., [facility] [phone]

## 2019-01-13 NOTE — OCCUPATIONAL THERAPY NOTE
OCCUPATIONAL THERAPY EVALUATION - INPATIENT     Room Number: 129/819-B  Evaluation Date: 1/13/2019  Type of Evaluation: Initial  Presenting Problem: dizziness, fall    Physician Order: IP Consult to Occupational Therapy  Reason for Therapy: ADL/IADL Dysfun PLAN  OT Treatment Plan: Balance activities; ADL training;Functional transfer training; Endurance training; Compensatory technique education       OCCUPATIONAL THERAPY MEDICAL/SOCIAL HISTORY     Problem List  Principal Problem:    Dizziness  Active Proble reading  Patient Visual Report:  diplopia has resolved  Tracking:  able to track stimulus in all quads without difficulty  Pt able to read wall clock accurately. R eye is red.      PERCEPTION  Overall Perception Status:   WFL - within functional limits    S within reach;RN aware of session/findings; All patient questions and concerns addressed; Alarm set    OT Goals  Patients self stated goal is: to go to rehab     Patient will complete functional transfer with supervision  Comment:     Patient will complete to

## 2019-01-13 NOTE — PROGRESS NOTES
[facility]    Progress Note    [facility] Patient Status:  Observation    1934 MRN [id number]   Location [facility] Attending Marlin Lock MD   Hosp Day # 0 PCP Damion Cadena MD       Subjective:     Feeling better mild chronic degenerative disc disease. 2. No acute fracture or malalignment. No bony destructive process. 3. Bilateral thyroid lobe enlargement with small cystic changes. Findings suggest thyroid goiter.  Recommend thyroid ultrasound which can't be perform mastoid air cells may be sequela of inflammation. No osseous destruction or bony erosions.   Dictated by (CST): lSim Amezcua MD on 1/12/2019 at 15:35     Approved by (CST): Slim Amezcua MD on 1/12/2019 at 15:40          Mri Spine Cervical ([id number])    Res consult  - MRA carotids is negative as is ultrasound carotids  - echo with EF 99%, embolic source seen  - Due to a-fib, cards consulted and aspirin and plavix stopped and changed to eliquis  - f/u MRI cervical spine  Pt needs rehab. Lives alone.      PMR

## 2019-01-13 NOTE — PROGRESS NOTES
[facility] AND [facility]  Neurology Progress Note    Marisela Avery Patient Status:  Observation    1934 MRN [id number]   Location [facility] Attending Padilla Yao MD   Hosp Day # 0 PCP Ijeoma Johnson MD     Subjective:  Marisela Avery is a( lb    Height:           General: No apparent distress, well nourished, well groomed.   Head- Normocephalic, atraumatic  Eyes- No redness or swelling  Neck- No masses or adenopathy  CV: pulses were palpable and normal, no cyanosis or edema     Neurological: left cerebellar hemisphere. 4. Calvarium intact. Mild chronic sinusitis. No acute ocular abnormality. 5. Moderate intracranial atherosclerosis.      Dictated by (CST): Laxmi Pardo MD on 1/12/2019 at 12:55     Approved by (CST): Laxmi Pardo MD on 1 at 16:03     Approved by (CST): Graham Harden MD on 1/12/2019 at 16:23          Mri Brain (cpt=[id number])    Result Date: 1/12/2019  CONCLUSION:   Moderately motion degraded exam.  Mild chronic microvascular ischemic disease without acute intracranial abnorma 2-4 weeks.     Favian End  1/13/2019  11:33 AM

## 2019-01-13 NOTE — CM/SW NOTE
Tentative referral sent to [facility] has new insurance and wants to find out how much it will cover, etc. SW explained that the best way to address the costs is to contact the insurance company directly.       Brielle Holder, ROMAINE., H.[phone]

## 2019-01-13 NOTE — PROGRESS NOTES
[facility] HOSP - [facility]    Cardiology Progress Note    Eunice Juarez Patient Status:  Observation    1934 MRN [id number]   Location [address] [facility] Attending Joe Lawson MD   Hosp Day # 0 PCP Rae Koenig MD     80year old female, c mg Oral Daily   • AmLODIPine Besylate  10 mg Oral Daily   • atorvastatin  40 mg Oral Nightly   • metoprolol Tartrate  25 mg Oral BID   • apixaban  2.5 mg Oral BID     Exam  Gen: No acute distress, alert and oriented x3  Pulm: Lungs clear  Neck: No JVD  CV: Dictated by (CST): Amadou Sow MD on 1/12/2019 at 13:18     Approved by (CST): Amadou Sow MD on 1/12/2019 at 13:24          Mra Brain (UDN=[id number])    Result Date: 1/12/2019  CONCLUSION:   Markedly motion degraded exam.  Atherosclerotic plaque see 1/12/2019  CONCLUSION: Mild amount of atheromatous plaque in the bilateral carotid arteries without high-grade stenosis using Doppler criteria.      Dictated by (CST): Brian Padron MD on 1/12/2019 at 21:11     Approved by (CST): Brian Padron MD on 1/12/2019

## 2019-01-13 NOTE — H&P
[address] Patient Status:  Observation    1934 MRN [id number]   Location [facility] Attending Joe Lawson MD   Hosp Day # 0 PCP Rae Koenig MD     Date:    Date of A Types: Cigarettes      Smokeless tobacco: Never Used    Alcohol use: No      Frequency: Never    Drug use: Not on file    Allergies/Medications:    Allergies: No Known Allergies    Home medications    Medications Prior to Admission:  Clopidogrel Bisulfate ( (cpt=72125)    Result Date: 1/12/2019  CONCLUSION:  1. Several spondylosis with disc space narrowing and mild chronic degenerative disc disease. 2. No acute fracture or malalignment. No bony destructive process.  3. Bilateral thyroid lobe enlargement with s likely secondary from chronic microangiopathy. No acute hemorrhage. Partial opacification of the bilateral mastoid air cells may be sequela of inflammation. No osseous destruction or bony erosions.   Dictated by (CST): Chad Rothman MD on 1/12/2019 at 15:35

## 2019-01-13 NOTE — CONSULTS
[facility] AND [facility]  [facility] Cardiology Consult Note    Elijah Metlakatla Patient Status:  Observation    1934 MRN [id number]   Location [facility] Attending Marlin Lock MD   Hosp Day # 0 PCP Damion Cadena MD     80year old female, consu intestine    • Essential hypertension    • H/O heart artery stent    • High blood pressure    • High cholesterol    • Hyperlipidemia      Past Surgical History:   Procedure Laterality Date   • HERNIA SURGERY       History reviewed.  No pertinent family hist

## 2019-01-14 NOTE — PLAN OF CARE
Ok to discharge patient to rehab. Report given to RN at [facility]. Tele and IV discontinued. Patient transported through family.

## 2019-01-14 NOTE — PROGRESS NOTES
[facility] HOSP - [facility]    Cardiology Progress Note    [facility] Patient Status:  Observation    1934 MRN [id number]   Location [address] [place] [address] Attending Felipe Irizarry MD   Hosp Day # 0 PCP Mg Sims MD       80 KVEM old female, Intake/Output Summary (Last 24 hours) at 1/14/2019 1137  Last data filed at 1/14/2019 0600  Gross per 24 hour   Intake 130 ml   Output 500 ml   Net -370 ml       TELE:    Scheduled Meds:   • atorvastatin  80 mg Oral Nightly   • docusate sodium  100 mg degenerative disc disease. 2. No acute fracture or malalignment. No bony destructive process. 3. Bilateral thyroid lobe enlargement with small cystic changes. Findings suggest thyroid goiter.  Recommend thyroid ultrasound which can't be performed on a nonem cells may be sequela of inflammation. No osseous destruction or bony erosions.   Dictated by (CST): Akila Darnell MD on 1/12/2019 at 15:35     Approved by (CST): Akila Darnell MD on 1/12/2019 at 15:40          Mri Spine Cervical (cpt=72141)    Result Date: 1/

## 2019-01-14 NOTE — DISCHARGE SUMMARY
[facility]    Discharge Summary    Zi Knowles Patient Status:  Observation    1934 MRN [id number]   Location [address] [facility] Attending Candance Lewis, MD   Hosp Day # 0 PCP Lu Moe MD     Date of Admission: 20 spinning. Denies f/c. Denies CP or SOB. Pt has remote hx of a-fib at [facility] after her TAA repair. Pt noted to have runs of a-fib here.     Hospital Course:     Pt was admitted and treated as below:    TIA.   Less likely small stroke however MRI ne daily.    Refills:  0        STOP taking these medications    aspirin 81 MG Tabs        PLAVIX OR              Where to Get Your Medications      Please  your prescriptions at the location directed by your doctor or nurse    Bring a paper prescriptio

## 2019-01-14 NOTE — PROGRESS NOTES
[facility]  [facility] Cardiology Progress Note    Sherrye Boas Patient Status:  Observation    1934 MRN [id number]   Location [address] [place] [address] Attending Otto Pack MD   Hosp Day # 0 PCP Sara Neil MD        60 ZCWL old female, c atherosclerosis    • Diverticulosis of large intestine    • Essential hypertension    • H/O heart artery stent    • High blood pressure    • High cholesterol    • Hyperlipidemia      Past Surgical History:   Procedure Laterality Date   • HERNIA SURGERY

## 2019-01-14 NOTE — CM/SW NOTE
SW met w/ pt to f/u on discharge plans. Pt stated that referral was previously made to [facility] over the weekend, but requested to go to [facility] as first choice due to having a hx there.      SW made a referral to [facility] and Mil[facility] Franco, requesting to

## 2019-01-14 NOTE — TELEPHONE ENCOUNTER
TIA CLINIC SCREENING    1. Date of ED visit/TIA diagnosis:  1.11.19   Time of discharge from ED:  n/a    2. Is patient currently admitted? Yes   If YES, skip to #7 - TIA Clinic Appointment not required.     3. Does patient already see an [facility] neurologist?

## 2019-01-14 NOTE — CM/SW NOTE
Olga Cano from [facility] informed SW that they have obtained insurance authorization and will be ready for pt at 515pm. Pt's son, Perez Pollard stated he will transport pt to [facility] at 5pm. Pt has been informed of discharge time. DCSS notified of discharge.  RN/Jax

## 2019-03-16 PROBLEM — E87.1 HYPONATREMIA: Status: ACTIVE | Noted: 2019-01-01

## 2019-03-16 NOTE — H&P
[address] [facility]  : 1934    Status: Emergency  Day #: 0    Attending: Xin Millard MD  PCP: Sera Crooks MD     Date of Encounter:  3/16/2019  Date of Admission:  3/16/2019     Chief Complaint: Fátima Huffman years: 79        Types: Cigarettes      Smokeless tobacco: Never Used    Alcohol use: No      Frequency: Never    Drug use: Not on file      Allergies: No Known Allergies    Medications :   apixaban 2.5 MG Oral Tab,  Take 1 tablet (2.5 mg total) by mouth 2 consult  -check urine cecilia Estrada  -Cayla@[employer]  -recheck Na tonight    Paroxysmal afib  -con't amiodarone, metoprolol    HTN  -BP elevated  -hydralazine IV prn in addition to home meds    H/o thoracic aortic aneurysm   -CT a few days ago ok    DVT Prophylaxis:

## 2019-03-16 NOTE — ED PROVIDER NOTES
Patient Seen in: [facility] Emergency Department    History   Patient presents with:  Fatigue (constitutional, neurologic)    Stated Complaint: Weakness    HPI    80year old female with h/o a fib on eliquis, htn, high cholesterol who presents wit HPI.  Constitutional and vital signs reviewed. All other systems reviewed and negative except as noted above.     Physical Exam     ED Triage Vitals   BP 03/16/19 1502 (!) 190/131   Pulse 03/16/19 1502 60   Resp 03/16/19 1502 16   Temp 03/16/19 1502 97 Clarity Urine Cloudy (*)     Protein Urine >=500 (*)     Ketones Urine 20  (*)     Blood Urine Small (*)     RBC URINE 28 (*)     Bacteria Urine Moderate (*)     All other components within normal limits   BASIC METABOLIC PANEL (8) - Abnormal; Notable f EKG    Rate, intervals and axes as noted on EKG Report.   Rate: 60  Rhythm: Sinus Rhythm  Reading: NSR             MDM       Pulse Ox: 95%, Normal, RA    Cardiac Monitor: Pulse Readings from Last 1 Encounters:  03/16/19 : 61  , sinus, normal for rate an below, repeat Na at 7p, and pt fall risk    D/w Dr Vijaya Alonso - will consult, advises start normal saline 50cc/hr, will get further labs (urine osm, serum osm, urine creat, urine Na).  Advises repeat Na at 7p with nursing on floor to call her which I relayed to

## 2019-03-16 NOTE — ED NOTES
Received pt from triage. Pt here with her son for increased weakness over the past few days. Pt states she saw her PMD yesterday and had lab work done and was told her sodium was low. Pt states chronic low back pain, worse today.  Pt ambulated to the [facility]

## 2019-03-16 NOTE — ED NOTES
Orders for admission, patient is aware of plan and ready to go upstairs.  Any questions, please call ED MAMADOU Layton  at [facility]

## 2019-03-16 NOTE — ED INITIAL ASSESSMENT (HPI)
Generalized weakness over the last week. Weakness worsening since onset. Per family, she is unable to walk on her own today.

## 2019-03-17 NOTE — PROGRESS NOTES
[facility]    Report of Consultation    [facility] Patient Status:  Inpatient    1934 MRN [id number]   Location [facility] 5SW/SE Attending Bobbi Rodriguez MD   Hosp Day # 1 PCP Jalil Brush MD     Date of Admission:  3/16/2 packet 17 g 17 g Oral Daily PRN   magnesium hydroxide (MILK OF MAGNESIA) 400 MG/5ML suspension 30 mL 30 mL Oral Daily PRN   bisacodyl (DULCOLAX) rectal suppository 10 mg 10 mg Rectal Daily PRN   FLEET ENEMA (FLEET) 7-19 GM/118ML enema 133 mL 1 enema Rectal distress  Heent: NC AT, mucous memb clear, neck supple  Pulm: Lungs clear, normal respiratory effort  CV: Heart with regular rate and rhythm, no edema  Abd: Abdomen soft, nontender, nondistended, no organomegaly, bowel sounds present          Results:

## 2019-03-17 NOTE — PLAN OF CARE
DISCHARGE PLANNING    • Discharge to home or other facility with appropriate resources Progressing        METABOLIC/FLUID AND ELECTROLYTES - ADULT    • Electrolytes maintained within normal limits Progressing    • Hemodynamic stability and optimal renal fu

## 2019-03-17 NOTE — PLAN OF CARE
On transfer 5th floor RN reported that pharmacy was to send 3%NaCl to the PCCU. Waited for the IV fluids then put in a missing dose request. Pharmacy said they sent the fluids. The fluids did not arrive to [facility].  Called pharmacy and they realized they sen

## 2019-03-17 NOTE — PLAN OF CARE
Problem: Patient Centered Care  Goal: Patient preferences are identified and integrated in the patient's plan of care  Interventions:  - What would you like us to know as we care for you?  Hx TIA 2/11/19  - Provide timely, complete, and accurate information injury  INTERVENTIONS:  - Assess pt frequently for physical needs  - Identify cognitive and physical deficits and behaviors that affect risk of falls.   - Carmi fall precautions as indicated by assessment.  - Educate pt/family on patient safety includin made ware of critically low sodium levels.  PO lasix x1 dose ordered and administered, iv fluids infusing  Goal: Hemodynamic stability and optimal renal function maintained  INTERVENTIONS:  - Monitor labs and assess for signs and symptoms of volume excess o

## 2019-03-17 NOTE — PLAN OF CARE
METABOLIC/FLUID AND ELECTROLYTES - ADULT    • Electrolytes maintained within normal limits Not Progressing        Na levels remain low.  3% Nacl to be infused  DISCHARGE PLANNING    • Discharge to home or other facility with appropriate resources Progressin

## 2019-03-17 NOTE — PHYSICAL THERAPY NOTE
PHYSICAL THERAPY EVALUATION - INPATIENT     Room Number: 211/211-A  Evaluation Date: 3/17/2019  Type of Evaluation: Initial   Physician Order: PT Eval and Treat    Presenting Problem: hyponatremia, weakness  Reason for Therapy: Mobility Dysfunction and [facility]  Frequency (Obs): 3x/week       PHYSICAL THERAPY MEDICAL/SOCIAL HISTORY     History related to current admission: weakness at home     Problem List  Principal Problem:    Hyponatremia      Past Medical History  Past Medical History:   Diagnosis Date Standing: Fair -  Dynamic Standing: Poor +    ADDITIONAL TESTS                                    NEUROLOGICAL FINDINGS                      ACTIVITY TOLERANCE  Pulse: 63  Heart Rate Source: Monitor                   O2 WALK           Ambulation oxygen aj Goal #3 Patient is able to ambulate 75 feet with assist device: walker - rolling at assistance level: supervision   Goal #3   Current Status    Goal #4    Goal #4   Current Status    Goal #5 Patient to demonstrate independence with home activity/exercise

## 2019-03-17 NOTE — PROGRESS NOTES
[facility]  Progress Note     Patricaanne Morton  : 1934    Status: Inpatient  Day #: 1    Attending: Khadijah Gan MD  PCP: Ruben Carpenter MD      Assessment and Plan     Hypoosmolar Hyponatremia - ? SIADH, excess water consumption while no 0.28*   --   0.29*   --    GFRAA  120   --   124   --   123   --    GFRNAA  104   --   108   --   107   --    CA  8.0*   --   7.4*   --   7.5*   --    NA  116*   < >  116*  117*  119*  119*   K  3.9   --   3.7   --   3.7   --    CL  80*   --   82*   --   8

## 2019-03-17 NOTE — PLAN OF CARE
Problem: Patient Centered Care  Goal: Patient preferences are identified and integrated in the patient's plan of care  Interventions:  - What would you like us to know as we care for you?  Hx TIA 2/11/19  - Provide timely, complete, and accurate information

## 2019-03-18 NOTE — CM/SW NOTE
3/18: PT/OT recommending subacute rehab. Met with patient alone at bedside. Per patient, she lives alone in a ranch style home. Patient uses a rollator, walker & shower chair. She states that she is typically independent with ADLs but has been struggling.

## 2019-03-18 NOTE — PLAN OF CARE
- 3% NS restarted  - PICC line placed  - Up to chair  - confused at times, forgetful  - endorsed to next RN at 12:30    DISCHARGE PLANNING    • Discharge to home or other facility with appropriate resources Progressing        METABOLIC/FLUID AND ELECTROLYT

## 2019-03-18 NOTE — PAYOR COMM NOTE
--------------  ADMISSION REVIEW     Payor: [facility] Sahil Schafer [place] #:  [id number]  Authorization Number: [id number]    Admit date: 3/16/19  Admit time: 1815       Admitting Physician: Rhoda Roth MD  Attending Physician:  Rhoda Roth MD  [facility] Used    Alcohol use: No      Frequency: Never    Drug use: Not on file      Review of Systems    Positive for stated complaint: Weakness  Other systems are as noted in HPI. Constitutional and vital signs reviewed.       All other systems reviewed and negat Nursing note and vitals reviewed.         ED Course     Labs Reviewed   URINALYSIS WITH CULTURE REFLEX - Abnormal; Notable for the following components:       Result Value    Clarity Urine Cloudy (*)     Protein Urine >=500 (*)     Ketones Urine 20  (*) METABOLIC PANEL (8)   CBC WITH DIFFERENTIAL WITH PLATELET   SODIUM, SERUM   RAINBOW DRAW BLUE   RAINBOW DRAW LAVENDER   RAINBOW DRAW LIGHT GREEN   RAINBOW DRAW GOLD     EKG    Rate, intervals and axes as noted on EKG Report.   Rate: 60  Rhythm: Sinus Rhythm get further labs (urine osm, serum osm, urine creat, urine Na). Advises repeat Na at 7p with nursing on floor to call her which I relayed to our nurse for nurse-nurse sign out. Advises bladder scan and place wilson if > 150cc.       Disposition and Plan Smoker        Packs/day: 1.00        Years: 70.00        Pack years: 79        Types: Cigarettes      Smokeless tobacco: Never Used    Alcohol use: No      Frequency: Never    Drug use: Not on file      Allergies: No Known Allergies    Medications :   apix consumption while not eating much, dehydration  -nephrology consult  -check urine Na, osm  -[employer]  -recheck Na tonight    Paroxysmal afib  -con't amiodarone, metoprolol    HTN  -BP elevated  -hydralazine IV prn in addition to home meds    H/o thoracic Flako Espana RN    3/17/2019 2030 Given 25 mg Oral Jacob Michaud RN      Normal Saline Flush 0.9 % injection 3 mL     Date Action Dose Route User    3/18/2019 0907 Given 3 mL Intravenous Flako Espana RN      Normal Sa amio/metoprolol       Results for Andriy Perez (MRN [id number]) as of 3/18/2019 12:59   Ref.  Range 1/14/2019 05:44 3/16/2019 15:46 3/16/2019 18:54 3/16/2019 21:40 3/16/2019 23:43 3/17/2019 03:08 3/17/2019 06:49 3/17/2019 09:56 3/17/2019 16:03 3/17/2019

## 2019-03-18 NOTE — PROGRESS NOTES
[facility]  Progress Note     Chelle Mariscal  : 1934    Status: Inpatient  Day #: 2    Attending: Astrid Crews MD  PCP: Gifty Scherer MD      Assessment and Plan     Hypoosmolar Hyponatremia - ? SIADH, excess water consumption while no CREATSERUM  0.29*   --    --   0.34*  0.45*   GFRAA  123   --    --   117  106   GFRNAA  107   --    --   101  92   CA  7.5*   --    --   7.2*  7.9*   ALB   --    --    --   2.1*   --    NA  119*   < >  124*  125*  124*   K  3.7   --    --   3.4*  3.4*

## 2019-03-18 NOTE — OCCUPATIONAL THERAPY NOTE
OCCUPATIONAL THERAPY EVALUATION - INPATIENT     Room Number: 211/211-A  Evaluation Date: 3/18/2019  Type of Evaluation: Initial  Presenting Problem: (hyponatremia)    Physician Order: IP Consult to Occupational Therapy  Reason for Therapy: ADL/IADL Dysfunc required min a for sit to stand, and began transferring to the chair however her right knee buckled and she required assist from therapist to prevent fall.  Once she regained balance she required step by step cuing to take steps, safely use walker (not let Alone           Other Equipment: (RW)          Drives: No(dtr does shopping and son takes to appts)       Stairs in Home: 3STE, one level setup  Use of Assistive Device(s): RW    Prior Level of [place]: Prior to admission, patient was living at home a Little  -   Eating meals?: A Little    AM-PAC Score:  Score: 14  Approx Degree of Impairment: 59.67%  Standardized Score (AM-PAC Scale): 33.39  CMS Modifier (G-Code): CK    FUNCTIONAL TRANSFER ASSESSMENT  Supine to Sit : Contact guard assist  Sit to Stand:

## 2019-03-19 NOTE — PROGRESS NOTES
[facility]  Progress Note     Hilma Bumpers  : 1934    Status: Inpatient  Day #: 3    Attending: Tammy Shay MD  PCP: Carolyn Musa MD      Assessment and Plan     Hypoosmolar Hyponatremia - ? SIADH, excess water consumption while no 03/18/19   0436  03/18/19   0834  03/18/19   1235  03/18/19   1629  03/18/19   1957  03/19/19   0502   BUN  14  15  15   --    --   13   CREATSERUM  0.34*  0.45*  0.25*   --    --   0.29*   GFRAA  117  106  129   --    --   123   GFRNAA  101  92  112   --

## 2019-03-19 NOTE — PROGRESS NOTES
[facility]    Progress Note    Hilma Bumpers Patient Status:  Inpatient    1934 MRN [id number]   Location [facility] 2W/SW Attending Rhoda Roth MD   Hosp Day # 3 PCP Carolyn Musa MD       Subjective:   Hilma Bumpers is a( --    --   7.7*   NA  124*  125*  128*  131*  131*   K  3.4*  3.8   --    --   4.2   CL  87*  90*   --    --   97*   CO2  31.0  29.0   --    --   30.0                    Tabitha Lott MD  3/19/2019

## 2019-03-20 NOTE — PHYSICAL THERAPY NOTE
PHYSICAL THERAPY TREATMENT NOTE - INPATIENT     Room Number: 730/229-X       Presenting Problem: hyponatremia, weakness    Problem List  Principal Problem:    Hyponatremia      PHYSICAL THERAPY ASSESSMENT     MAMADOU Love approved participation in physical t Fair +           Static Standing: Fair  Dynamic Standing: Fair -    ACTIVITY TOLERANCE                         O2 WALK        SPO2 Ambulation on Oxygen: 84  Ambulation oxygen flow (liters per minute): 4      AM-PAC '6-Clicks' INPATIENT SHORT FORM - BASIC M at assistance level: supervision   Goal #3   Current Status CGA/Min assist 15 feet with RW    Goal #4    Goal #4   Current Status    Goal #5 Patient to demonstrate independence with home activity/exercise instructions provided to patient in preparation for

## 2019-03-20 NOTE — PLAN OF CARE
METABOLIC/FLUID AND ELECTROLYTES - ADULT    • Electrolytes maintained within normal limits Progressing    • Hemodynamic stability and optimal renal function maintained Progressing        PAIN - ADULT    • Verbalizes/displays adequate comfort level or patie

## 2019-03-20 NOTE — PLAN OF CARE
Pt being transferred to room 565. Skin checked with Cleatus Call. No new skin sores noted.  Arianna Sender

## 2019-03-20 NOTE — PROGRESS NOTES
[facility]    Progress Note    Samia Lawrence Patient Status:  Inpatient    1934 MRN [id number]   Location [facility] 2W/SW Attending China Poe MD   Hosp Day # 4 PCP Dulce Mcdaniel MD       Subjective:   Samia Lawrence is a(

## 2019-03-20 NOTE — PROGRESS NOTES
[facility] - [facility]    Progress Note    Hill Lezama Patient Status:  Inpatient    1934 MRN [id number]   Location [facility] 5SW/SE Attending Caroline Buitrago MD   Hosp Day # 4 PCP Connor Quiñones MD       Subjective:   Hill Lezama 133 and patient feels better   -off hypertonic saline, back on NS  -monitor Na, slowly correct     Paroxysmal afib  -con't amiodarone, metoprolol     HTN  -BP better  -contiue BB      H/o thoracic aortic aneurysm   -CT a few days ago ok    Dyslipidemia  co

## 2019-03-20 NOTE — PHYSICAL THERAPY NOTE
OCCUPATIONAL THERAPY TREATMENT NOTE - INPATIENT        Room Number: 461/393-Y       Presenting Problem: (hyponatremia)    Problem List  Principal Problem:    Hyponatremia      OCCUPATIONAL THERAPY ASSESSMENT     Patient received long sitting in bed just co taking off regular lower body clothing?: A Lot  -   Bathing (including washing, rinsing, drying)?: A Little  -   Toileting, which includes using toilet, bedpan or urinal? : A Lot  -   Putting on and taking off regular upper body clothing?: A Little  -   Ta

## 2019-03-21 NOTE — PROGRESS NOTES
Patient's discharge held. Will not be going today. CT of the chest ordered for sob. 2:00 pm.    Vincenzo Pulido MD    Addendum 520 pm   Patient's CT scan shows probable leaking aortic aneurysm.  Discussed with Shivani and suggest immediate transfer to outside

## 2019-03-21 NOTE — PLAN OF CARE
Problem: Patient Centered Care  Goal: Patient preferences are identified and integrated in the patient's plan of care  Interventions:  - What would you like us to know as we care for you?  Hx TIA 2/11/19  - Provide timely, complete, and accurate information injury  INTERVENTIONS:  - Assess pt frequently for physical needs  - Identify cognitive and physical deficits and behaviors that affect risk of falls.   - [place] fall precautions as indicated by assessment.  - Educate pt/family on patient safety includin Hemodynamic stability and optimal renal function maintained  INTERVENTIONS:  - Monitor labs and assess for signs and symptoms of volume excess or deficit  - Monitor intake, output and patient weight  - Monitor urine specific gravity, serum osmolarity and s

## 2019-03-21 NOTE — PHYSICAL THERAPY NOTE
Per RN in am patient getting ready for discharge. Im pm patient off the floor for stat CT of lungs r/o PE. Will follow tomorrow.

## 2019-03-21 NOTE — PLAN OF CARE
Problem: Patient Centered Care  Goal: Patient preferences are identified and integrated in the patient's plan of care  Interventions:  - What would you like us to know as we care for you?  Hx TIA 2/11/19  - Provide timely, complete, and accurate information injury  INTERVENTIONS:  - Assess pt frequently for physical needs  - Identify cognitive and physical deficits and behaviors that affect risk of falls.   - [place] fall precautions as indicated by assessment.  - Educate pt/family on patient safety includin

## 2019-03-21 NOTE — PLAN OF CARE
Problem: Patient Centered Care  Goal: Patient preferences are identified and integrated in the patient's plan of care  Interventions:  - What would you like us to know as we care for you?  Hx TIA 2/11/19  - Provide timely, complete, and accurate information injury  INTERVENTIONS:  - Assess pt frequently for physical needs  - Identify cognitive and physical deficits and behaviors that affect risk of falls.   - [place] fall precautions as indicated by assessment.  - Educate pt/family on patient safety includin Hemodynamic stability and optimal renal function maintained  INTERVENTIONS:  - Monitor labs and assess for signs and symptoms of volume excess or deficit  - Monitor intake, output and patient weight  - Monitor urine specific gravity, serum osmolarity and s

## 2019-03-21 NOTE — DISCHARGE SUMMARY
[facility]    Discharge Summary    Silvia Hicks Patient Status:  Inpatient    1934 MRN [id number]   Location [facility] 5SW/SE Attending Concepcion Haynes MD   [facility] Day # 5 PCP Taylor Etienne MD     Date of Admission: 3/16/20 Abdomen: Soft, nontender, nondistended. Positive bowel sounds. No rebound or guarding. Neurologic: No focal neurological deficits. Musculoskeletal: Moves all extremities. Hospital Course:     Hypoosmolar Hyponatremia - ? SIADH, excess water consump Instructions Prescription details   acetaminophen 325 MG Tabs  Commonly known as:  TYLENOL      Take 650 mg by mouth every 6 (six) hours as needed for Pain.    Refills:  0     amiodarone HCl 200 MG Tabs  Commonly known as:  PACERONE      Take 200 mg by mout

## 2019-03-22 NOTE — PROGRESS NOTES
Night MD - CCU Admission    79 y/o woman transferred to CCU after CT chest done prior to planned discharge showed increased ascending aorta graft leak, compared with CT of August 2016.     Summary/Impressions:  ---Asymptomatic increased \"endoleak/extralumi January  --will convert to PCU status -> D/W'd Dr. Mercy Asher, who agrees to plan  --will give supplemental PRN dose of IV labetalol to target SBP < 130  --D/W'd nurse in CCU/PCU    (Total > 60 minutes critical care time, including much communication with phys

## 2019-03-22 NOTE — DISCHARGE SUMMARY
[place] [facility]    Discharge Summary    [facility] ER Patient Status:  Inpatient    1934 MRN [id number]   Location [facility] 5SW/SE Attending Leslye Jamil MD   [address] Day # 6 PCP Jalil Brush MD     Date of Admission: 3/16/20 Family is in agreement. Transfer bed is available and she will be discharged today. Discharge Physical Exam:   Physical Exam:    General: No acute distress. Respiratory: Clear to auscultation bilaterally. No wheezes. No rhonchi.   Cardiovascular: S MG Oral Tab  Take 25 mg by mouth 2 (two) times daily. docusate sodium 100 MG Oral Cap  Take 50 mg by mouth 2 (two) times daily.                 Discharge Diet: Cardiac diet     Discharge Activity: As tolerated       Discharge Medications      CONTINUE ta

## 2019-03-22 NOTE — CM/SW NOTE
I got a call from CV APNElsa, regarding this pt request for transfer to a tertiary care center, [facility].   Pt with history of Thoracic aortic aneurysm, s/p repair in the past at [facility] now found to have leaking aortic aneurysm, CV surgery was consulted,

## 2019-03-22 NOTE — PLAN OF CARE
Problem: PAIN - ADULT  Goal: Verbalizes/displays adequate comfort level or patient's stated pain goal  INTERVENTIONS:  - Encourage pt to monitor pain and request assistance  - Assess pain using appropriate pain scale  - Administer analgesics based on type for signs of decreased coronary artery perfusion - ex. Angina  - Evaluate fluid balance, assess for edema, trend weights  Outcome: Progressing  CT chest on 3/21 showed increased ascending aorta graft leak.  Pt transferred to PCCU, pt observed stable and den

## 2019-03-22 NOTE — PROGRESS NOTES
Misc. Note    CV Surgery consulted for Thoracic Aneurysm with endoleak and extraluminal extravasation of contrast. Pt. Has hx of Thoracic stent. Dr. Elvira Warren was consulted by PCP and has evaluated case today. He is covering for  today.  Recommending

## 2019-03-23 NOTE — PROGRESS NOTES
Patient transferred to [address] [place] [address] [facility], daughter at bedside.  Report called to Yasmine Knight, current medications and plan of care reviewed, transferred with double lumen PICC to right upper arm, o2 2L per n.c., Labetalol 10mg ivp given just prior to

## 2019-03-24 NOTE — PAYOR COMM NOTE
DISCHARGE REVIEW    Payor: [address] [place] [address] #:  [id number]  Authorization Number: [id number]    Admit date: 3/16/19  Admit time:  1815  Discharge Date: 3/22/2019  5:46 PM     Admitting Physician: Montez Crocker MD  Attending Physician: Rowena Price is a(n) 80year old female with a histry of HL, HTN, CAD, Thoracic aortic aneurysm s/p repair, afib who presents with a one-week history of generalized weakness with decreased appetite.   She had a CT scan of the chest abdomen pelvis to evaluate a tho CHF component  - will have cardiologist at [address] [place] [address] to evaluate     Paroxysmal afib  -con't amiodarone, metoprolol     Hypoxia  - secondary to probable CHF diastolic versus endoleak from thoracic aortic aneurysm?  - transfer patient to [place]     HTN  -BP be tablet  Refills:  0     atorvastatin 40 MG Tabs  Commonly known as:  LIPITOR      Take 40 mg by mouth nightly. Refills:  0     docusate sodium 100 MG Caps  Commonly known as:  COLACE      Take 50 mg by mouth 2 (two) times daily.    Refills:  0     [place]

## 2019-04-13 NOTE — CONSULTS
CV Surgery Consultation    81 yo F with known descending thoracic aneurysm and prior thoracic stent graft. She has had multiple thoracic aneurysm procedures, and is currently followed by Dr. Jordan Griffith.   She had a CT chest which demonstrated an endo
